# Patient Record
Sex: FEMALE | URBAN - METROPOLITAN AREA
[De-identification: names, ages, dates, MRNs, and addresses within clinical notes are randomized per-mention and may not be internally consistent; named-entity substitution may affect disease eponyms.]

---

## 2021-03-24 ENCOUNTER — TELEPHONE (OUTPATIENT)
Dept: GASTROENTEROLOGY | Age: 61
End: 2021-03-24

## 2021-03-24 NOTE — TELEPHONE ENCOUNTER
Pt called in today stating Dr Amanda Lewis seen pt while she was in the hospital and told her to sched a colonoscopy in 3-4 weeks. Pt states she is a [employer] employee w/ [employer] and she wants to know if Dr Cj Kasper is contracted. Writer informed pt we are contracted w/ [employer], but we are not contracted w/ [employer] for [facility]. Pt is instructed to follow up with a [facility] [id number] physician group. She voices her understanding.